# Patient Record
Sex: FEMALE | Race: WHITE | NOT HISPANIC OR LATINO | Employment: OTHER | URBAN - METROPOLITAN AREA
[De-identification: names, ages, dates, MRNs, and addresses within clinical notes are randomized per-mention and may not be internally consistent; named-entity substitution may affect disease eponyms.]

---

## 2019-11-01 ENCOUNTER — HOSPITAL ENCOUNTER (INPATIENT)
Facility: MEDICAL CENTER | Age: 69
LOS: 1 days | DRG: 644 | End: 2019-11-03
Attending: EMERGENCY MEDICINE | Admitting: INTERNAL MEDICINE
Payer: MEDICARE

## 2019-11-01 ENCOUNTER — APPOINTMENT (OUTPATIENT)
Dept: RADIOLOGY | Facility: MEDICAL CENTER | Age: 69
DRG: 644 | End: 2019-11-01
Attending: EMERGENCY MEDICINE
Payer: MEDICARE

## 2019-11-01 PROBLEM — E27.1 ADRENAL INSUFFICIENCY (ADDISON'S DISEASE) (HCC): Status: ACTIVE | Noted: 2019-11-01

## 2019-11-01 PROBLEM — R55 SYNCOPE: Status: ACTIVE | Noted: 2019-11-01

## 2019-11-01 PROBLEM — E16.2 HYPOGLYCEMIA: Status: ACTIVE | Noted: 2019-11-01

## 2019-11-01 LAB
ALBUMIN SERPL BCP-MCNC: 4.5 G/DL (ref 3.2–4.9)
ALBUMIN/GLOB SERPL: 1.4 G/DL
ALP SERPL-CCNC: 99 U/L (ref 30–99)
ALT SERPL-CCNC: 37 U/L (ref 2–50)
ANION GAP SERPL CALC-SCNC: 13 MMOL/L (ref 0–11.9)
AST SERPL-CCNC: 55 U/L (ref 12–45)
BASOPHILS # BLD AUTO: 0.5 % (ref 0–1.8)
BASOPHILS # BLD: 0.03 K/UL (ref 0–0.12)
BILIRUB SERPL-MCNC: 0.4 MG/DL (ref 0.1–1.5)
BUN SERPL-MCNC: 25 MG/DL (ref 8–22)
CALCIUM SERPL-MCNC: 9.2 MG/DL (ref 8.5–10.5)
CHLORIDE SERPL-SCNC: 102 MMOL/L (ref 96–112)
CO2 SERPL-SCNC: 18 MMOL/L (ref 20–33)
CORTIS SERPL-MCNC: 6.5 UG/DL (ref 0–23)
CREAT SERPL-MCNC: 1.24 MG/DL (ref 0.5–1.4)
EKG IMPRESSION: NORMAL
EOSINOPHIL # BLD AUTO: 0.04 K/UL (ref 0–0.51)
EOSINOPHIL NFR BLD: 0.6 % (ref 0–6.9)
ERYTHROCYTE [DISTWIDTH] IN BLOOD BY AUTOMATED COUNT: 44.6 FL (ref 35.9–50)
GLOBULIN SER CALC-MCNC: 3.2 G/DL (ref 1.9–3.5)
GLUCOSE BLD-MCNC: 54 MG/DL (ref 65–99)
GLUCOSE BLD-MCNC: 65 MG/DL (ref 65–99)
GLUCOSE SERPL-MCNC: 321 MG/DL (ref 65–99)
HCT VFR BLD AUTO: 43 % (ref 37–47)
HGB BLD-MCNC: 14.1 G/DL (ref 12–16)
IMM GRANULOCYTES # BLD AUTO: 0.01 K/UL (ref 0–0.11)
IMM GRANULOCYTES NFR BLD AUTO: 0.2 % (ref 0–0.9)
LYMPHOCYTES # BLD AUTO: 0.88 K/UL (ref 1–4.8)
LYMPHOCYTES NFR BLD: 13.7 % (ref 22–41)
MAGNESIUM SERPL-MCNC: 1.6 MG/DL (ref 1.5–2.5)
MCH RBC QN AUTO: 30.8 PG (ref 27–33)
MCHC RBC AUTO-ENTMCNC: 32.8 G/DL (ref 33.6–35)
MCV RBC AUTO: 93.9 FL (ref 81.4–97.8)
MONOCYTES # BLD AUTO: 0.36 K/UL (ref 0–0.85)
MONOCYTES NFR BLD AUTO: 5.6 % (ref 0–13.4)
NEUTROPHILS # BLD AUTO: 5.09 K/UL (ref 2–7.15)
NEUTROPHILS NFR BLD: 79.4 % (ref 44–72)
NRBC # BLD AUTO: 0 K/UL
NRBC BLD-RTO: 0 /100 WBC
PHOSPHATE SERPL-MCNC: 3.9 MG/DL (ref 2.5–4.5)
PLATELET # BLD AUTO: 149 K/UL (ref 164–446)
PMV BLD AUTO: 10.5 FL (ref 9–12.9)
POTASSIUM SERPL-SCNC: 3.9 MMOL/L (ref 3.6–5.5)
PROT SERPL-MCNC: 7.7 G/DL (ref 6–8.2)
RBC # BLD AUTO: 4.58 M/UL (ref 4.2–5.4)
SODIUM SERPL-SCNC: 133 MMOL/L (ref 135–145)
TROPONIN T SERPL-MCNC: 24 NG/L (ref 6–19)
WBC # BLD AUTO: 6.4 K/UL (ref 4.8–10.8)

## 2019-11-01 PROCEDURE — 83735 ASSAY OF MAGNESIUM: CPT

## 2019-11-01 PROCEDURE — 96374 THER/PROPH/DIAG INJ IV PUSH: CPT

## 2019-11-01 PROCEDURE — 80053 COMPREHEN METABOLIC PANEL: CPT

## 2019-11-01 PROCEDURE — 96376 TX/PRO/DX INJ SAME DRUG ADON: CPT

## 2019-11-01 PROCEDURE — 82533 TOTAL CORTISOL: CPT

## 2019-11-01 PROCEDURE — 71045 X-RAY EXAM CHEST 1 VIEW: CPT

## 2019-11-01 PROCEDURE — 87040 BLOOD CULTURE FOR BACTERIA: CPT

## 2019-11-01 PROCEDURE — 02HV33Z INSERTION OF INFUSION DEVICE INTO SUPERIOR VENA CAVA, PERCUTANEOUS APPROACH: ICD-10-PCS | Performed by: EMERGENCY MEDICINE

## 2019-11-01 PROCEDURE — 700111 HCHG RX REV CODE 636 W/ 250 OVERRIDE (IP): Performed by: EMERGENCY MEDICINE

## 2019-11-01 PROCEDURE — 94760 N-INVAS EAR/PLS OXIMETRY 1: CPT

## 2019-11-01 PROCEDURE — 700105 HCHG RX REV CODE 258

## 2019-11-01 PROCEDURE — 84484 ASSAY OF TROPONIN QUANT: CPT

## 2019-11-01 PROCEDURE — 93005 ELECTROCARDIOGRAM TRACING: CPT

## 2019-11-01 PROCEDURE — 36415 COLL VENOUS BLD VENIPUNCTURE: CPT

## 2019-11-01 PROCEDURE — 85025 COMPLETE CBC W/AUTO DIFF WBC: CPT

## 2019-11-01 PROCEDURE — 93005 ELECTROCARDIOGRAM TRACING: CPT | Performed by: EMERGENCY MEDICINE

## 2019-11-01 PROCEDURE — 36556 INSERT NON-TUNNEL CV CATH: CPT

## 2019-11-01 PROCEDURE — C1751 CATH, INF, PER/CENT/MIDLINE: HCPCS

## 2019-11-01 PROCEDURE — 82962 GLUCOSE BLOOD TEST: CPT

## 2019-11-01 PROCEDURE — 84100 ASSAY OF PHOSPHORUS: CPT

## 2019-11-01 PROCEDURE — 99291 CRITICAL CARE FIRST HOUR: CPT

## 2019-11-01 PROCEDURE — 96375 TX/PRO/DX INJ NEW DRUG ADDON: CPT

## 2019-11-01 PROCEDURE — 99291 CRITICAL CARE FIRST HOUR: CPT | Performed by: INTERNAL MEDICINE

## 2019-11-01 RX ORDER — OMEPRAZOLE 20 MG/1
20 CAPSULE, DELAYED RELEASE ORAL DAILY
COMMUNITY

## 2019-11-01 RX ORDER — ZOLPIDEM TARTRATE 10 MG/1
10 TABLET ORAL
COMMUNITY

## 2019-11-01 RX ORDER — M-VIT,TX,IRON,MINS/CALC/FOLIC 27MG-0.4MG
1 TABLET ORAL DAILY
COMMUNITY

## 2019-11-01 RX ORDER — GABAPENTIN 600 MG/1
600 TABLET ORAL 3 TIMES DAILY
COMMUNITY

## 2019-11-01 RX ORDER — MAGNESIUM SULFATE HEPTAHYDRATE 40 MG/ML
2 INJECTION, SOLUTION INTRAVENOUS ONCE
Status: COMPLETED | OUTPATIENT
Start: 2019-11-02 | End: 2019-11-02

## 2019-11-01 RX ORDER — DEXTROSE MONOHYDRATE 100 MG/ML
INJECTION, SOLUTION INTRAVENOUS
Status: COMPLETED
Start: 2019-11-01 | End: 2019-11-01

## 2019-11-01 RX ORDER — HYDROCORTISONE 10 MG/1
10 TABLET ORAL 3 TIMES DAILY
Status: ON HOLD | COMMUNITY
End: 2019-11-03 | Stop reason: SDUPTHER

## 2019-11-01 RX ORDER — LISINOPRIL 40 MG/1
40 TABLET ORAL DAILY
Status: ON HOLD | COMMUNITY
End: 2019-11-03

## 2019-11-01 RX ORDER — ONDANSETRON 4 MG/1
4 TABLET, ORALLY DISINTEGRATING ORAL ONCE
Status: COMPLETED | OUTPATIENT
Start: 2019-11-01 | End: 2019-11-01

## 2019-11-01 RX ORDER — PHENYLEPHRINE HCL IN 0.9% NACL 0.5 MG/5ML
SYRINGE (ML) INTRAVENOUS
Status: DISPENSED
Start: 2019-11-01 | End: 2019-11-02

## 2019-11-01 RX ORDER — DULOXETIN HYDROCHLORIDE 60 MG/1
60 CAPSULE, DELAYED RELEASE ORAL DAILY
COMMUNITY

## 2019-11-01 RX ADMIN — HYDROCORTISONE SODIUM SUCCINATE 100 MG: 100 INJECTION, POWDER, FOR SOLUTION INTRAMUSCULAR; INTRAVENOUS at 21:11

## 2019-11-01 RX ADMIN — DEXTROSE MONOHYDRATE 250 ML: 100 INJECTION, SOLUTION INTRAVENOUS at 22:00

## 2019-11-01 RX ADMIN — DEXTROSE MONOHYDRATE 250 ML: 100 INJECTION, SOLUTION INTRAVENOUS at 21:12

## 2019-11-01 RX ADMIN — ONDANSETRON 4 MG: 4 TABLET, ORALLY DISINTEGRATING ORAL at 22:20

## 2019-11-01 SDOH — HEALTH STABILITY: MENTAL HEALTH: HOW OFTEN DO YOU HAVE A DRINK CONTAINING ALCOHOL?: 2-4 TIMES A MONTH

## 2019-11-01 SDOH — HEALTH STABILITY: MENTAL HEALTH: HOW MANY STANDARD DRINKS CONTAINING ALCOHOL DO YOU HAVE ON A TYPICAL DAY?: 1 OR 2

## 2019-11-01 SDOH — HEALTH STABILITY: MENTAL HEALTH: HOW OFTEN DO YOU HAVE 6 OR MORE DRINKS ON ONE OCCASION?: NEVER

## 2019-11-01 ASSESSMENT — ENCOUNTER SYMPTOMS
VOMITING: 1
DEPRESSION: 0
BLURRED VISION: 0
FEVER: 0
CHILLS: 1
SPUTUM PRODUCTION: 0
HEADACHES: 1
SHORTNESS OF BREATH: 0
DOUBLE VISION: 0
SPEECH CHANGE: 0
NAUSEA: 1
WEAKNESS: 1
FOCAL WEAKNESS: 0
PALPITATIONS: 0

## 2019-11-02 ENCOUNTER — APPOINTMENT (OUTPATIENT)
Dept: CARDIOLOGY | Facility: MEDICAL CENTER | Age: 69
DRG: 644 | End: 2019-11-02
Attending: INTERNAL MEDICINE
Payer: MEDICARE

## 2019-11-02 ENCOUNTER — APPOINTMENT (OUTPATIENT)
Dept: RADIOLOGY | Facility: MEDICAL CENTER | Age: 69
DRG: 644 | End: 2019-11-02
Attending: INTERNAL MEDICINE
Payer: MEDICARE

## 2019-11-02 PROBLEM — E87.1 HYPONATREMIA: Status: ACTIVE | Noted: 2019-11-02

## 2019-11-02 PROBLEM — K21.9 GERD (GASTROESOPHAGEAL REFLUX DISEASE): Status: ACTIVE | Noted: 2019-11-02

## 2019-11-02 PROBLEM — E27.2 ADDISONIAN CRISIS (HCC): Status: ACTIVE | Noted: 2019-11-02

## 2019-11-02 LAB
ALBUMIN SERPL BCP-MCNC: 4 G/DL (ref 3.2–4.9)
ALBUMIN/GLOB SERPL: 1.3 G/DL
ALP SERPL-CCNC: 90 U/L (ref 30–99)
ALT SERPL-CCNC: 36 U/L (ref 2–50)
ANION GAP SERPL CALC-SCNC: 8 MMOL/L (ref 0–11.9)
ANION GAP SERPL CALC-SCNC: 9 MMOL/L (ref 0–11.9)
AST SERPL-CCNC: 46 U/L (ref 12–45)
BASOPHILS # BLD AUTO: 0.5 % (ref 0–1.8)
BASOPHILS # BLD AUTO: 0.6 % (ref 0–1.8)
BASOPHILS # BLD: 0.03 K/UL (ref 0–0.12)
BASOPHILS # BLD: 0.04 K/UL (ref 0–0.12)
BILIRUB SERPL-MCNC: 0.4 MG/DL (ref 0.1–1.5)
BUN SERPL-MCNC: 26 MG/DL (ref 8–22)
BUN SERPL-MCNC: 27 MG/DL (ref 8–22)
CALCIUM SERPL-MCNC: 7.9 MG/DL (ref 8.5–10.5)
CALCIUM SERPL-MCNC: 8.7 MG/DL (ref 8.5–10.5)
CHLORIDE SERPL-SCNC: 103 MMOL/L (ref 96–112)
CHLORIDE SERPL-SCNC: 110 MMOL/L (ref 96–112)
CO2 SERPL-SCNC: 19 MMOL/L (ref 20–33)
CO2 SERPL-SCNC: 21 MMOL/L (ref 20–33)
CREAT SERPL-MCNC: 1.31 MG/DL (ref 0.5–1.4)
CREAT SERPL-MCNC: 1.32 MG/DL (ref 0.5–1.4)
EOSINOPHIL # BLD AUTO: 0.01 K/UL (ref 0–0.51)
EOSINOPHIL # BLD AUTO: 0.03 K/UL (ref 0–0.51)
EOSINOPHIL NFR BLD: 0.2 % (ref 0–6.9)
EOSINOPHIL NFR BLD: 0.4 % (ref 0–6.9)
ERYTHROCYTE [DISTWIDTH] IN BLOOD BY AUTOMATED COUNT: 43.2 FL (ref 35.9–50)
ERYTHROCYTE [DISTWIDTH] IN BLOOD BY AUTOMATED COUNT: 44.2 FL (ref 35.9–50)
GLOBULIN SER CALC-MCNC: 3 G/DL (ref 1.9–3.5)
GLUCOSE BLD-MCNC: 110 MG/DL (ref 65–99)
GLUCOSE BLD-MCNC: 112 MG/DL (ref 65–99)
GLUCOSE BLD-MCNC: 115 MG/DL (ref 65–99)
GLUCOSE BLD-MCNC: 122 MG/DL (ref 65–99)
GLUCOSE BLD-MCNC: 124 MG/DL (ref 65–99)
GLUCOSE BLD-MCNC: 127 MG/DL (ref 65–99)
GLUCOSE BLD-MCNC: 128 MG/DL (ref 65–99)
GLUCOSE BLD-MCNC: 138 MG/DL (ref 65–99)
GLUCOSE SERPL-MCNC: 138 MG/DL (ref 65–99)
GLUCOSE SERPL-MCNC: 195 MG/DL (ref 65–99)
HCT VFR BLD AUTO: 37.9 % (ref 37–47)
HCT VFR BLD AUTO: 40.8 % (ref 37–47)
HGB BLD-MCNC: 12.5 G/DL (ref 12–16)
HGB BLD-MCNC: 13.3 G/DL (ref 12–16)
IMM GRANULOCYTES # BLD AUTO: 0.02 K/UL (ref 0–0.11)
IMM GRANULOCYTES # BLD AUTO: 0.02 K/UL (ref 0–0.11)
IMM GRANULOCYTES NFR BLD AUTO: 0.3 % (ref 0–0.9)
IMM GRANULOCYTES NFR BLD AUTO: 0.3 % (ref 0–0.9)
LACTATE BLD-SCNC: 1.8 MMOL/L (ref 0.5–2)
LACTATE BLD-SCNC: 2.1 MMOL/L (ref 0.5–2)
LV EJECT FRACT  99904: 60
LYMPHOCYTES # BLD AUTO: 0.6 K/UL (ref 1–4.8)
LYMPHOCYTES # BLD AUTO: 0.61 K/UL (ref 1–4.8)
LYMPHOCYTES NFR BLD: 8.9 % (ref 22–41)
LYMPHOCYTES NFR BLD: 9.7 % (ref 22–41)
MAGNESIUM SERPL-MCNC: 1.5 MG/DL (ref 1.5–2.5)
MCH RBC QN AUTO: 30.4 PG (ref 27–33)
MCH RBC QN AUTO: 30.4 PG (ref 27–33)
MCHC RBC AUTO-ENTMCNC: 32.6 G/DL (ref 33.6–35)
MCHC RBC AUTO-ENTMCNC: 33 G/DL (ref 33.6–35)
MCV RBC AUTO: 92.2 FL (ref 81.4–97.8)
MCV RBC AUTO: 93.4 FL (ref 81.4–97.8)
MONOCYTES # BLD AUTO: 0.13 K/UL (ref 0–0.85)
MONOCYTES # BLD AUTO: 0.36 K/UL (ref 0–0.85)
MONOCYTES NFR BLD AUTO: 2.1 % (ref 0–13.4)
MONOCYTES NFR BLD AUTO: 5.2 % (ref 0–13.4)
NEUTROPHILS # BLD AUTO: 5.39 K/UL (ref 2–7.15)
NEUTROPHILS # BLD AUTO: 5.83 K/UL (ref 2–7.15)
NEUTROPHILS NFR BLD: 84.6 % (ref 44–72)
NEUTROPHILS NFR BLD: 87.2 % (ref 44–72)
NRBC # BLD AUTO: 0 K/UL
NRBC # BLD AUTO: 0 K/UL
NRBC BLD-RTO: 0 /100 WBC
NRBC BLD-RTO: 0 /100 WBC
PLATELET # BLD AUTO: 154 K/UL (ref 164–446)
PLATELET # BLD AUTO: 157 K/UL (ref 164–446)
PMV BLD AUTO: 10.4 FL (ref 9–12.9)
PMV BLD AUTO: 10.6 FL (ref 9–12.9)
POTASSIUM SERPL-SCNC: 3.6 MMOL/L (ref 3.6–5.5)
POTASSIUM SERPL-SCNC: 4 MMOL/L (ref 3.6–5.5)
PROT SERPL-MCNC: 7 G/DL (ref 6–8.2)
RBC # BLD AUTO: 4.11 M/UL (ref 4.2–5.4)
RBC # BLD AUTO: 4.37 M/UL (ref 4.2–5.4)
SODIUM SERPL-SCNC: 133 MMOL/L (ref 135–145)
SODIUM SERPL-SCNC: 137 MMOL/L (ref 135–145)
WBC # BLD AUTO: 6.2 K/UL (ref 4.8–10.8)
WBC # BLD AUTO: 6.9 K/UL (ref 4.8–10.8)

## 2019-11-02 PROCEDURE — 700102 HCHG RX REV CODE 250 W/ 637 OVERRIDE(OP): Performed by: INTERNAL MEDICINE

## 2019-11-02 PROCEDURE — 80053 COMPREHEN METABOLIC PANEL: CPT

## 2019-11-02 PROCEDURE — 83735 ASSAY OF MAGNESIUM: CPT

## 2019-11-02 PROCEDURE — 700111 HCHG RX REV CODE 636 W/ 250 OVERRIDE (IP): Performed by: INTERNAL MEDICINE

## 2019-11-02 PROCEDURE — 71045 X-RAY EXAM CHEST 1 VIEW: CPT

## 2019-11-02 PROCEDURE — 80048 BASIC METABOLIC PNL TOTAL CA: CPT

## 2019-11-02 PROCEDURE — 85025 COMPLETE CBC W/AUTO DIFF WBC: CPT

## 2019-11-02 PROCEDURE — 770020 HCHG ROOM/CARE - TELE (206)

## 2019-11-02 PROCEDURE — 93306 TTE W/DOPPLER COMPLETE: CPT | Mod: 26 | Performed by: INTERNAL MEDICINE

## 2019-11-02 PROCEDURE — 83605 ASSAY OF LACTIC ACID: CPT

## 2019-11-02 PROCEDURE — 99223 1ST HOSP IP/OBS HIGH 75: CPT | Mod: AI | Performed by: INTERNAL MEDICINE

## 2019-11-02 PROCEDURE — 700105 HCHG RX REV CODE 258: Performed by: INTERNAL MEDICINE

## 2019-11-02 PROCEDURE — 700111 HCHG RX REV CODE 636 W/ 250 OVERRIDE (IP): Performed by: HOSPITALIST

## 2019-11-02 PROCEDURE — 93306 TTE W/DOPPLER COMPLETE: CPT

## 2019-11-02 PROCEDURE — A9270 NON-COVERED ITEM OR SERVICE: HCPCS | Performed by: INTERNAL MEDICINE

## 2019-11-02 PROCEDURE — 82962 GLUCOSE BLOOD TEST: CPT | Mod: 91

## 2019-11-02 RX ORDER — OMEPRAZOLE 20 MG/1
20 CAPSULE, DELAYED RELEASE ORAL DAILY
Status: DISCONTINUED | OUTPATIENT
Start: 2019-11-02 | End: 2019-11-03 | Stop reason: HOSPADM

## 2019-11-02 RX ORDER — ZOLPIDEM TARTRATE 5 MG/1
10 TABLET ORAL
Status: DISCONTINUED | OUTPATIENT
Start: 2019-11-02 | End: 2019-11-03 | Stop reason: HOSPADM

## 2019-11-02 RX ORDER — GABAPENTIN 300 MG/1
600 CAPSULE ORAL 3 TIMES DAILY
Status: DISCONTINUED | OUTPATIENT
Start: 2019-11-02 | End: 2019-11-02

## 2019-11-02 RX ORDER — AMOXICILLIN 250 MG
2 CAPSULE ORAL 2 TIMES DAILY
Status: DISCONTINUED | OUTPATIENT
Start: 2019-11-02 | End: 2019-11-03 | Stop reason: HOSPADM

## 2019-11-02 RX ORDER — BISACODYL 10 MG
10 SUPPOSITORY, RECTAL RECTAL
Status: DISCONTINUED | OUTPATIENT
Start: 2019-11-02 | End: 2019-11-03 | Stop reason: HOSPADM

## 2019-11-02 RX ORDER — SODIUM CHLORIDE 9 MG/ML
1000 INJECTION, SOLUTION INTRAVENOUS ONCE
Status: COMPLETED | OUTPATIENT
Start: 2019-11-02 | End: 2019-11-02

## 2019-11-02 RX ORDER — SODIUM CHLORIDE, SODIUM LACTATE, POTASSIUM CHLORIDE, AND CALCIUM CHLORIDE .6; .31; .03; .02 G/100ML; G/100ML; G/100ML; G/100ML
1000 INJECTION, SOLUTION INTRAVENOUS
Status: DISCONTINUED | OUTPATIENT
Start: 2019-11-02 | End: 2019-11-03 | Stop reason: HOSPADM

## 2019-11-02 RX ORDER — SODIUM CHLORIDE 9 MG/ML
INJECTION, SOLUTION INTRAVENOUS CONTINUOUS
Status: DISCONTINUED | OUTPATIENT
Start: 2019-11-02 | End: 2019-11-02

## 2019-11-02 RX ORDER — DULOXETIN HYDROCHLORIDE 60 MG/1
60 CAPSULE, DELAYED RELEASE ORAL DAILY
Status: DISCONTINUED | OUTPATIENT
Start: 2019-11-02 | End: 2019-11-03 | Stop reason: HOSPADM

## 2019-11-02 RX ORDER — POLYETHYLENE GLYCOL 3350 17 G/17G
1 POWDER, FOR SOLUTION ORAL
Status: DISCONTINUED | OUTPATIENT
Start: 2019-11-02 | End: 2019-11-03 | Stop reason: HOSPADM

## 2019-11-02 RX ORDER — ACETAMINOPHEN 325 MG/1
650 TABLET ORAL EVERY 6 HOURS PRN
Status: DISCONTINUED | OUTPATIENT
Start: 2019-11-02 | End: 2019-11-03 | Stop reason: HOSPADM

## 2019-11-02 RX ORDER — MAGNESIUM SULFATE HEPTAHYDRATE 40 MG/ML
2 INJECTION, SOLUTION INTRAVENOUS ONCE
Status: COMPLETED | OUTPATIENT
Start: 2019-11-02 | End: 2019-11-02

## 2019-11-02 RX ORDER — GABAPENTIN 300 MG/1
600 CAPSULE ORAL 2 TIMES DAILY
Status: DISCONTINUED | OUTPATIENT
Start: 2019-11-02 | End: 2019-11-03 | Stop reason: HOSPADM

## 2019-11-02 RX ADMIN — GABAPENTIN 600 MG: 300 CAPSULE ORAL at 06:25

## 2019-11-02 RX ADMIN — OMEPRAZOLE 20 MG: 20 CAPSULE, DELAYED RELEASE ORAL at 06:25

## 2019-11-02 RX ADMIN — SODIUM CHLORIDE 1000 ML: 9 INJECTION, SOLUTION INTRAVENOUS at 02:43

## 2019-11-02 RX ADMIN — HYDROCORTISONE SODIUM SUCCINATE 50 MG: 100 INJECTION, POWDER, FOR SOLUTION INTRAMUSCULAR; INTRAVENOUS at 11:59

## 2019-11-02 RX ADMIN — SODIUM CHLORIDE: 9 INJECTION, SOLUTION INTRAVENOUS at 02:30

## 2019-11-02 RX ADMIN — GABAPENTIN 600 MG: 300 CAPSULE ORAL at 17:53

## 2019-11-02 RX ADMIN — MAGNESIUM SULFATE IN WATER 2 G: 40 INJECTION, SOLUTION INTRAVENOUS at 08:28

## 2019-11-02 RX ADMIN — HYDROCORTISONE SODIUM SUCCINATE 25 MG: 100 INJECTION, POWDER, FOR SOLUTION INTRAMUSCULAR; INTRAVENOUS at 17:54

## 2019-11-02 RX ADMIN — HYDROCORTISONE SODIUM SUCCINATE 50 MG: 100 INJECTION, POWDER, FOR SOLUTION INTRAMUSCULAR; INTRAVENOUS at 06:25

## 2019-11-02 RX ADMIN — ZOLPIDEM TARTRATE 10 MG: 5 TABLET ORAL at 21:46

## 2019-11-02 RX ADMIN — DULOXETINE HYDROCHLORIDE 60 MG: 60 CAPSULE, DELAYED RELEASE ORAL at 06:25

## 2019-11-02 RX ADMIN — HYDROCORTISONE SODIUM SUCCINATE 50 MG: 100 INJECTION, POWDER, FOR SOLUTION INTRAMUSCULAR; INTRAVENOUS at 02:29

## 2019-11-02 RX ADMIN — MAGNESIUM SULFATE 2 G: 2 INJECTION INTRAVENOUS at 02:28

## 2019-11-02 ASSESSMENT — COPD QUESTIONNAIRES
DO YOU EVER COUGH UP ANY MUCUS OR PHLEGM?: NO/ONLY WITH OCCASIONAL COLDS OR INFECTIONS
COPD SCREENING SCORE: 2
HAVE YOU SMOKED AT LEAST 100 CIGARETTES IN YOUR ENTIRE LIFE: NO/DON'T KNOW
DURING THE PAST 4 WEEKS HOW MUCH DID YOU FEEL SHORT OF BREATH: NONE/LITTLE OF THE TIME

## 2019-11-02 ASSESSMENT — LIFESTYLE VARIABLES
HAVE YOU EVER FELT YOU SHOULD CUT DOWN ON YOUR DRINKING: NO
TOTAL SCORE: 0
EVER FELT BAD OR GUILTY ABOUT YOUR DRINKING: NO
EVER_SMOKED: NEVER
TOTAL SCORE: 0
ON A TYPICAL DAY WHEN YOU DRINK ALCOHOL HOW MANY DRINKS DO YOU HAVE: 1
DOES PATIENT WANT TO STOP DRINKING: NO
HAVE PEOPLE ANNOYED YOU BY CRITICIZING YOUR DRINKING: NO
EVER HAD A DRINK FIRST THING IN THE MORNING TO STEADY YOUR NERVES TO GET RID OF A HANGOVER: NO
TOTAL SCORE: 0
HAVE PEOPLE ANNOYED YOU BY CRITICIZING YOUR DRINKING: NO
AVERAGE NUMBER OF DAYS PER WEEK YOU HAVE A DRINK CONTAINING ALCOHOL: 1
TOTAL SCORE: 0
ALCOHOL_USE: YES
CONSUMPTION TOTAL: NEGATIVE
ALCOHOL_USE: YES
HAVE YOU EVER FELT YOU SHOULD CUT DOWN ON YOUR DRINKING: NO
CONSUMPTION TOTAL: INCOMPLETE
EVER HAD A DRINK FIRST THING IN THE MORNING TO STEADY YOUR NERVES TO GET RID OF A HANGOVER: NO
EVER_SMOKED: NEVER
TOTAL SCORE: 0
EVER FELT BAD OR GUILTY ABOUT YOUR DRINKING: NO
TOTAL SCORE: 0
HOW MANY TIMES IN THE PAST YEAR HAVE YOU HAD 5 OR MORE DRINKS IN A DAY: 0

## 2019-11-02 ASSESSMENT — PATIENT HEALTH QUESTIONNAIRE - PHQ9
1. LITTLE INTEREST OR PLEASURE IN DOING THINGS: NOT AT ALL
2. FEELING DOWN, DEPRESSED, IRRITABLE, OR HOPELESS: NOT AT ALL
SUM OF ALL RESPONSES TO PHQ9 QUESTIONS 1 AND 2: 0

## 2019-11-02 ASSESSMENT — ENCOUNTER SYMPTOMS
NAUSEA: 0
FOCAL WEAKNESS: 0
SEIZURES: 0
PALPITATIONS: 0
SPUTUM PRODUCTION: 0
COUGH: 0
BACK PAIN: 0
WEAKNESS: 1
BLOOD IN STOOL: 0
CHILLS: 0
SORE THROAT: 0
BRUISES/BLEEDS EASILY: 0
ABDOMINAL PAIN: 0
LOSS OF CONSCIOUSNESS: 1
DIAPHORESIS: 0
NECK PAIN: 0
DIZZINESS: 1
SHORTNESS OF BREATH: 0
WHEEZING: 0
VOMITING: 0
HEADACHES: 0
DIARRHEA: 0
MYALGIAS: 0
FEVER: 0
BLURRED VISION: 0
FLANK PAIN: 0

## 2019-11-02 ASSESSMENT — COGNITIVE AND FUNCTIONAL STATUS - GENERAL
DRESSING REGULAR LOWER BODY CLOTHING: A LITTLE
SUGGESTED CMS G CODE MODIFIER DAILY ACTIVITY: CI
TOILETING: A LITTLE
TOILETING: A LITTLE
DAILY ACTIVITIY SCORE: 23
SUGGESTED CMS G CODE MODIFIER DAILY ACTIVITY: CJ
MOVING FROM LYING ON BACK TO SITTING ON SIDE OF FLAT BED: A LITTLE
WALKING IN HOSPITAL ROOM: A LITTLE
DAILY ACTIVITIY SCORE: 22
CLIMB 3 TO 5 STEPS WITH RAILING: A LITTLE
SUGGESTED CMS G CODE MODIFIER MOBILITY: CJ
MOBILITY SCORE: 22
STANDING UP FROM CHAIR USING ARMS: A LITTLE
MOBILITY SCORE: 20
SUGGESTED CMS G CODE MODIFIER MOBILITY: CJ
WALKING IN HOSPITAL ROOM: A LITTLE
CLIMB 3 TO 5 STEPS WITH RAILING: A LITTLE

## 2019-11-02 NOTE — PROGRESS NOTES
2 RN skin check complete.   Devices in place O2, RIJ     Skin assessed under devices yes.  Confirmed pressure ulcers found on none.  New potential pressure ulcers noted on none. Wound consult placed no.  The following interventions in place none  Pt is alert and oriented x4.  SBA for ambulation. Turns self in bed.  Skin intact

## 2019-11-02 NOTE — ASSESSMENT & PLAN NOTE
Patient has been started on IV hydrocortisone   Aggressive IV fluid hydration with NS  IV Levophed titrate to map greater than 65  Rule out infection.  Follow blood cultures and UA  Rule out cardiogenic causes of hypotension. Continuous cardiac monitoring.  Check 2D echo

## 2019-11-02 NOTE — ED PROVIDER NOTES
ED Provider Note    Scribed for Jimi Saba D.O. by Kym Madison. 11/1/2019  8:41 PM    Primary care provider: None noted  Means of arrival: EMS  History obtained from: Patient and   History limited by: None    CHIEF COMPLAINT  Chief Complaint   Patient presents with   • Syncope     Witnessed syncope by  while sitting. Denies fall, no head trauma. Takes daily aspirin. BG = 68 PTA by EMS     HPI  Anastasia Land is a 69 y.o. female who presents to the Emergency Department for evaluation of a witnessed syncopal event at 6:00 PM tonight. She was attending a concert with her  and was sitting down. She got up to use the bathroom. She was unsteady on her feet and then loss consciousness. Per , she was unresponsive for 10 seconds. When she regained consciousness, she vomited. The patient reports drinking 1 martini before 6:00 PM and last ate at 6:00 PM. The patient endorses associated copper taste in mouth but denies any dysuria, chest pain, infections, or UTI.     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 She denies a history of cancer or talking insulin. She reports that she takes hydrocortisone and prednisone, which she last took at 5:00 PM tonight.     REVIEW OF SYSTEMS  Pertinent positives include syncope, loss of consciousness, vomiting, and copper taste in mouth.   Pertinent negatives include no head trauma, dysuria, chest pain, infections, or UTI.    All other systems reviewed and negative.    PAST MEDICAL HISTORY  Past Medical History:   Diagnosis Date   • Back pain    • Hypertension    • Psychiatric disorder     depression     SURGICAL HISTORY  Past Surgical History:   Procedure Laterality Date   • FINGER OR HAND INCISION AND DRAINAGE  8/3/08    Performed by VIMAL CANO at SURGERY [facility]      SOCIAL HISTORY  Social History     Tobacco Use   • Smoking status:  "Never Smoker   Substance Use Topics   • Alcohol use: Yes     Frequency: 2-4 times a month     Drinks per session: 1 or 2     Binge frequency: Never   • Drug use: No      Social History     Substance and Sexual Activity   Drug Use No     FAMILY HISTORY  History reviewed. No pertinent family history.    CURRENT MEDICATIONS  Current Outpatient Medications:   •  AMBIEN PO, , Disp: , Rfl:   •  PAXIL PO, , Disp: , Rfl:   •  DIOVAN PO, , Disp: , Rfl:   •  OXYCODONE HCL PO, , Disp: , Rfl:   •  METAMUCIL PO, , Disp: , Rfl:     ALLERGIES  Allergies   Allergen Reactions   • Heparin      PHYSICAL EXAM  VITAL SIGNS: /67   Pulse 98   Temp 37.3 °C (99.2 °F) (Temporal)   Resp 16   Ht 1.575 m (5' 2\")   Wt 79.4 kg (175 lb)   SpO2 95%   BMI 32.01 kg/m²     Nursing notes and vitals reviewed.  Constitutional: Well developed, Well nourished, No acute distress, Non-toxic appearance.   Eyes: PERRLA, EOMI, Conjunctiva normal, No discharge.   Cardiovascular: Normal heart rate, Normal rhythm, No murmurs, No rubs, No gallops.   Thorax & Lungs: No respiratory distress, No rales, No rhonchi, No wheezing, No chest tenderness.   Abdomen: Bowel sounds normal, Soft, No tenderness, No guarding, No rebound, No masses, No pulsatile masses.   Skin: Warm, Dry, No erythema, No rash.   Musculoskeletal: Intact distal pulses, No edema, No cyanosis, No clubbing. Good range of motion in all major joints. No tenderness to palpation or major deformities noted, no CVA tenderness, no midline back tenderness.   Neurologic: Alert & oriented x 3, Normal motor function, Normal sensory function, No focal deficits noted.  Psychiatric: Affect normal for clinical presentation.      DIAGNOSTIC STUDIES/PROCEDURES    LABS  Results for orders placed or performed during the hospital encounter of 11/01/19   CBC WITH DIFFERENTIAL   Result Value Ref Range    WBC 6.4 4.8 - 10.8 K/uL    RBC 4.58 4.20 - 5.40 M/uL    Hemoglobin 14.1 12.0 - 16.0 g/dL    Hematocrit 43.0 " 37.0 - 47.0 %    MCV 93.9 81.4 - 97.8 fL    MCH 30.8 27.0 - 33.0 pg    MCHC 32.8 (L) 33.6 - 35.0 g/dL    RDW 44.6 35.9 - 50.0 fL    Platelet Count 149 (L) 164 - 446 K/uL    MPV 10.5 9.0 - 12.9 fL    Neutrophils-Polys 79.40 (H) 44.00 - 72.00 %    Lymphocytes 13.70 (L) 22.00 - 41.00 %    Monocytes 5.60 0.00 - 13.40 %    Eosinophils 0.60 0.00 - 6.90 %    Basophils 0.50 0.00 - 1.80 %    Immature Granulocytes 0.20 0.00 - 0.90 %    Nucleated RBC 0.00 /100 WBC    Neutrophils (Absolute) 5.09 2.00 - 7.15 K/uL    Lymphs (Absolute) 0.88 (L) 1.00 - 4.80 K/uL    Monos (Absolute) 0.36 0.00 - 0.85 K/uL    Eos (Absolute) 0.04 0.00 - 0.51 K/uL    Baso (Absolute) 0.03 0.00 - 0.12 K/uL    Immature Granulocytes (abs) 0.01 0.00 - 0.11 K/uL    NRBC (Absolute) 0.00 K/uL   COMP METABOLIC PANEL   Result Value Ref Range    Sodium 133 (L) 135 - 145 mmol/L    Potassium 3.9 3.6 - 5.5 mmol/L    Chloride 102 96 - 112 mmol/L    Co2 18 (L) 20 - 33 mmol/L    Anion Gap 13.0 (H) 0.0 - 11.9    Glucose 321 (H) 65 - 99 mg/dL    Bun 25 (H) 8 - 22 mg/dL    Creatinine 1.24 0.50 - 1.40 mg/dL    Calcium 9.2 8.5 - 10.5 mg/dL    AST(SGOT) 55 (H) 12 - 45 U/L    ALT(SGPT) 37 2 - 50 U/L    Alkaline Phosphatase 99 30 - 99 U/L    Total Bilirubin 0.4 0.1 - 1.5 mg/dL    Albumin 4.5 3.2 - 4.9 g/dL    Total Protein 7.7 6.0 - 8.2 g/dL    Globulin 3.2 1.9 - 3.5 g/dL    A-G Ratio 1.4 g/dL   TROPONIN   Result Value Ref Range    Troponin T 24 (H) 6 - 19 ng/L   MAGNESIUM   Result Value Ref Range    Magnesium 1.6 1.5 - 2.5 mg/dL   PHOSPHORUS   Result Value Ref Range    Phosphorus 3.9 2.5 - 4.5 mg/dL   ESTIMATED GFR   Result Value Ref Range    GFR If  52 (A) >60 mL/min/1.73 m 2    GFR If Non  43 (A) >60 mL/min/1.73 m 2   ACCU-CHEK GLUCOSE   Result Value Ref Range    Glucose - Accu-Ck 54 (L) 65 - 99 mg/dL   ACCU-CHEK GLUCOSE   Result Value Ref Range    Glucose - Accu-Ck 65 65 - 99 mg/dL   EKG   Result Value Ref Range    Report       [facility] Emergency Dept.    Test Date:  2019  Pt Name:    TIMOTHY HANCOCK                 Department: ER  MRN:        [id number]                      Room:       RD 05  Gender:     Female                       Technician: [id number]  :        1950                   Requested By:ER TRIAGE PROTOCOL  Order #:    [id number]                    Reading MD: JUN NOYOLA DO    Measurements  Intervals                                Axis  Rate:       99                           P:          35  NV:         160                          QRS:        -3  QRSD:       87                           T:          3  QT:         340  QTc:        437    Interpretive Statements  Sinus rhythm  Inferior infarct, old  Consider anterior infarct  Compared to ECG 2008 12:04:09  Left ventricular hypertrophy no longer present  Myocardial infarct finding still present  Electronically Signed On 2019 23:43:33 PDT by JUN NOYOLA DO     All labs reviewed by me.    RADIOLOGY  DX-CHEST-PORTABLE (1 VIEW)   Final Result      1.  Right IJ central venous catheter tip projects over the distal SVC. No pneumothorax.   2.  No acute abnormality.      EC-ECHOCARDIOGRAM COMPLETE W/O CONT    (Results Pending)     The radiologist's interpretation of all radiological studies have been reviewed by me.    COURSE & MEDICAL DECISION MAKING  Pertinent Labs & Imaging studies reviewed. (See chart for details)    8:41 PM - Patient seen and examined at bedside.    Central Line Placement Procedure Note  Indication: vascular access, poor peripheral access, long term access, central venous monitoring and centrally administered medications    Consent: The patient provided verbal consent for this procedure.    Procedure: The patient was positioned appropriately and the skin over the right internal jugular vein was prepped with Chloraprep. Local anesthesia was obtained by infiltration using 1% Lidocaine without epinephrine.  A large  bore needle was used to identify the vein.  A guide wire was then inserted into the vein through the needle. A triple lumen catheter was then inserted into the vessel over the guide wire using the Seldinger technique.  All ports showed good, free flowing blood return and were flushed with saline solution.  The catheter was then securely fastened to the skin with sutures and covered with a sterile dressing.  A post procedure X-ray was ordered and showed good line position.    The patient tolerated the procedure well.    Complications: None        This is a charming 69 y.o. female that presents with hypoglycemia, syncopal episode.  Here in the emergency department, the patient had a blood sugar initially in the 60s and went down to the 50s.  She has poor vascular access and peripheral veins.  The first IV infiltrated prior to receiving medication although she was on an infusion of more of the glucose solution as we do not have D50 here in the hospital.  I was concerned secondary to her Dickson's history and may be an adrenal crisis therefore she received hydrocortisone 125 mg IV.  Following this, the patient's blood pressure did perform it to the 70 systolic range and we did not IV access therefore central venous catheter was placed without complication.  During the procedure, the patient's blood pressure did rebound with a systolic blood pressure 110 systolic and patient is acting appropriately.  There is one bout of syncope for approximately 10 seconds in front of me yet her airway is not compromised, I did not perform a jaw thrust and the patient awoke with no evidence of postictal event and the patient was mentating fine.  This point the patient does not present with an ST elevation myocardial infarction although she does have a slightly elevated troponin of 24 EKGs inconclusive.  She will receive IV fluids, glucose, hydrocortisone and will be admitted to Dr. Louis in critical condition.  In addition I have  discussed the patient with Dr. Hill for further evaluation and management.    CRITICAL CARE  The very real possibilty of a deterioration of this patient's condition required the highest level of my preparedness for sudden, emergent intervention.  I provided critical care services, which included medication orders, frequent reevaluations of the patient's condition and response to treatment, ordering and reviewing test results, and discussing the case with various consultants.  The critical care time associated with the care of the patient was 35 minutes. Review chart for interventions. This time is exclusive of any other billable procedures.       DISPOSITION:  Patient will be admitted to Dr. Hill in critical condition.    FINAL IMPRESSION  Hypoglycemia  Adrenal crisis  Central venous access  Hypotension  Critical care time 35 minutes     IKym (Belen), am scribing for, and in the presence of, Jimi Saba D.O    Electronically signed by: Kym Madison ([place]), 11/1/2019    IJimi D.O. personally performed the services described in this documentation, as scribed by Kym Madison in my presence, and it is both accurate and complete.    C.     The note accurately reflects work and decisions made by me.  Jimi Saba  11/1/2019  11:37 PM

## 2019-11-02 NOTE — ED TRIAGE NOTES
"Chief Complaint   Patient presents with   • Syncope     Witnessed syncope by  while sitting. Denies fall, no head trauma. Takes daily aspirin. BG = 68 PTA by EMS       Pt BIB [facility] for above complaint. Pt was walking down aisle at concert to go to the bathroom and felt dizzy so sat down. Witnessed syncope while sitting. No seizure activity. + nausea prior to event, vomited x 2 upon awakening. Denies head trauma. No neuro deficits noted.       EKG complete. Labs sent.       /67   Pulse 98   Temp 37.3 °C (99.2 °F) (Temporal)   Resp 16   Ht 1.575 m (5' 2\")   Wt 79.4 kg (175 lb)   SpO2 95%   BMI 32.01 kg/m²         "

## 2019-11-02 NOTE — ASSESSMENT & PLAN NOTE
Suspected to be secondary to adrenal insufficiency  Check echocardiogram, continuous cardiac monitoring, troponin,  Check orthostatics  Hourly  FS BS with titration of D10to keep BS greater than 80

## 2019-11-02 NOTE — PROGRESS NOTES
Patient admitted this morning by Dr. Hill with syncopal episode and hypoglycemia has a history of panhypopituitarism following pituitary tumor resection.  Patient is seen in the ICU chart reviewed and discussed with nursing staff pharmacist and Dr. Elizondo.. Patient clinically improved on stress dose hydrocortisone, will transfer to telemetry floor, will taper IV hydrocortisone and continue close clinical monitoring.  Continue telemetry monitoring check echocardiogram.  Plan of care reviewed with the patient and her  and the questions answered

## 2019-11-02 NOTE — CARE PLAN
Problem: Knowledge Deficit  Goal: Knowledge of disease process/condition, treatment plan, diagnostic tests, and medications will improve  Note:   Pt educated on plan of care and all interventions. Questions answered.     Problem: Fluid Volume:  Goal: Will maintain balanced intake and output  Note:   IV fluids per MD. Monitoring I/O throughout shift.

## 2019-11-02 NOTE — ASSESSMENT & PLAN NOTE
Rule out cardiogenic causes  Continuous cardiac monitoring on telemetry  Check 2D echo  Check orthostatics

## 2019-11-02 NOTE — ED NOTES
Syncopal episode during PIV insertion attempt witnessed by ERP. Pt vomited upon wakeng. ODT zofran given per MAR. Now pt lethargic, oriented x4.

## 2019-11-02 NOTE — ASSESSMENT & PLAN NOTE
Symptomatic with reported syncope  Q. one hour FS BS  Stress dose steroids  D10 infusion  Check cortisol level

## 2019-11-02 NOTE — ASSESSMENT & PLAN NOTE
Patient was given IV dextrose with improvement of her blood sugar  Hypoglycemic protocol in place

## 2019-11-02 NOTE — CONSULTS
Critical Care Consultation    Date of consult: 11/1/2019    Referring Physician  DAKOTA Hu*    Reason for Consultation  Symptomatic hypoglycemia    History of Presenting Illness  69 y.o. female with known history of previous pituitary lesion status post resection and on daily hydrocortisone, HTN, GERD, anxiety, and neuropathy who presented 11/1/2019 with hypoglycemia and syncope.  Patient was at the night with her  watching a show when she proceeded to go towards the bathroom after walking several steps felt very lightheaded generally weak and subsequently passed out first responders noted patient hypoglycemic in the 50s and patient was given dextrose upon arrival to the ED she had 2 further episodes that were similar and associated with hypoglycemia.  Her pressures have been labile with a systolic of 70s.  She denies any recent illnesses or other significant subjective symptoms.  Patient is visiting from [place].    Code Status  No Order    Review of Systems  Review of Systems   Constitutional: Positive for chills and malaise/fatigue. Negative for fever.   HENT: Negative for congestion.    Eyes: Negative for blurred vision and double vision.   Respiratory: Negative for sputum production and shortness of breath.    Cardiovascular: Negative for chest pain, palpitations and leg swelling.   Gastrointestinal: Positive for nausea and vomiting.   Genitourinary: Negative for dysuria.   Neurological: Positive for weakness and headaches. Negative for speech change and focal weakness.   Psychiatric/Behavioral: Negative for depression.   All other systems reviewed and are negative.      Past Medical History   has a past medical history of Back pain, Hypertension, and Psychiatric disorder.    Surgical History   has a past surgical history that includes finger or hand incision and drainage (8/3/08).    Family History  family history is not on file.  Reviewed not pertinent to current condition    Social  History   reports that she has never smoked. She does not have any smokeless tobacco history on file. She reports that she drinks alcohol. She reports that she does not use drugs.    Medications  Home Medications     Reviewed by Kary Mortensen (Pharmacy [facility]) on 11/01/19 at 2152  Med List Status: Complete   Medication Last Dose Status   aspirin EC (ECOTRIN) 81 MG Tablet Delayed Response 11/1/2019 Active   CALCIUM PO 11/1/2019 Active   Cholecalciferol (VITAMIN D3 PO) 11/1/2019 Active   DULoxetine (CYMBALTA) 60 MG Cap DR Particles delayed-release capsule 11/1/2019 Active   gabapentin (NEURONTIN) 600 MG tablet 11/1/2019 Active   hydrocortisone (CORTEF) 10 MG Tab 11/1/2019 Active   lisinopril (PRINIVIL, ZESTRIL) 40 MG tablet 11/1/2019 Active   MAGNESIUM PO 11/1/2019 Active   omeprazole (PRILOSEC) 20 MG delayed-release capsule 11/1/2019 Active   POTASSIUM PO 11/1/2019 Active   therapeutic multivitamin-minerals (THERAGRAN-M) Tab 11/1/2019 Active   zolpidem (AMBIEN) 10 MG Tab 10/31/2019 Active              Current Facility-Administered Medications   Medication Dose Route Frequency Provider Last Rate Last Dose   • DEXTROSE 10 % IV SOLN            • PHENYLEPHRINE HCL-NACL 1-0.9 MG/10ML-% IV SOSY            • norepinephrine (LEVOPHED) 8 mg in  mL Infusion  0-30 mcg/min Intravenous Continuous Jimi Saba D.O.         Current Outpatient Medications   Medication Sig Dispense Refill   • hydrocortisone (CORTEF) 10 MG Tab Take 10 mg by mouth 3 times a day.     • gabapentin (NEURONTIN) 600 MG tablet Take 600 mg by mouth 3 times a day.     • zolpidem (AMBIEN) 10 MG Tab Take 10 mg by mouth every bedtime.     • DULoxetine (CYMBALTA) 60 MG Cap DR Particles delayed-release capsule Take 60 mg by mouth every day.     • lisinopril (PRINIVIL, ZESTRIL) 40 MG tablet Take 40 mg by mouth every day.     • aspirin EC (ECOTRIN) 81 MG Tablet Delayed Response Take 81 mg by mouth every day.     • POTASSIUM PO Take 1 Dose by  mouth every day. Unknown OTC Strength.     • omeprazole (PRILOSEC) 20 MG delayed-release capsule Take 20 mg by mouth every day.     • MAGNESIUM PO Take 1 Dose by mouth every day. Unknown OTC Strength.     • Cholecalciferol (VITAMIN D3 PO) Take 1 Dose by mouth every day. Unknown OTC Strength.     • CALCIUM PO Take 1 Dose by mouth every day. Unknown OTC Strength.     • therapeutic multivitamin-minerals (THERAGRAN-M) Tab Take 1 Tab by mouth every day.         Allergies  Allergies   Allergen Reactions   • Heparin        Vital Signs last 24 hours  Temp:  [36.8 °C (98.3 °F)-37.3 °C (99.2 °F)] 36.8 °C (98.3 °F)  Pulse:  [] 94  Resp:  [16-27] 22  BP: ()/(37-68) 102/57  SpO2:  [95 %] 95 %    Physical Exam  Physical Exam   Constitutional: She appears well-developed and well-nourished.   HENT:   Head: Normocephalic and atraumatic.   Eyes: Pupils are equal, round, and reactive to light. Conjunctivae are normal.   Neck: Tracheal deviation present.   Cardiovascular: Normal rate and intact distal pulses.   Pulmonary/Chest: She has no wheezes. She has no rales.   Abdominal: Soft. She exhibits no distension. There is no tenderness.   Musculoskeletal: She exhibits no edema.   Neurological: No cranial nerve deficit.   Skin: Skin is warm and dry.   Psychiatric: She has a normal mood and affect.   Nursing note and vitals reviewed.      Fluids  No intake or output data in the 24 hours ending 19 5819    Laboratory  Recent Results (from the past 48 hour(s))   EKG    Collection Time: 19  8:29 PM   Result Value Ref Range    Report       [facility] Emergency Dept.    Test Date:  2019  Pt Name:    TIMOTHY HANCOCK                 Department: ER  MRN:        [id number]                      Room:       RD 05  Gender:     Female                       Technician: [id number]  :        1950                   Requested By:ER TRIAGE PROTOCOL  Order #:    [id number]                    Reading  MD:    Measurements  Intervals                                Axis  Rate:       99                           P:          35  NY:         160                          QRS:        -3  QRSD:       87                           T:          3  QT:         340  QTc:        437    Interpretive Statements  Sinus rhythm  Inferior infarct, old  Consider anterior infarct  Compared to ECG 08/03/2008 12:04:09  Left ventricular hypertrophy no longer present  Myocardial infarct finding still present     ACCU-CHEK GLUCOSE    Collection Time: 11/01/19  9:42 PM   Result Value Ref Range    Glucose - Accu-Ck 54 (L) 65 - 99 mg/dL   CBC WITH DIFFERENTIAL    Collection Time: 11/01/19 10:06 PM   Result Value Ref Range    WBC 6.4 4.8 - 10.8 K/uL    RBC 4.58 4.20 - 5.40 M/uL    Hemoglobin 14.1 12.0 - 16.0 g/dL    Hematocrit 43.0 37.0 - 47.0 %    MCV 93.9 81.4 - 97.8 fL    MCH 30.8 27.0 - 33.0 pg    MCHC 32.8 (L) 33.6 - 35.0 g/dL    RDW 44.6 35.9 - 50.0 fL    Platelet Count 149 (L) 164 - 446 K/uL    MPV 10.5 9.0 - 12.9 fL    Neutrophils-Polys 79.40 (H) 44.00 - 72.00 %    Lymphocytes 13.70 (L) 22.00 - 41.00 %    Monocytes 5.60 0.00 - 13.40 %    Eosinophils 0.60 0.00 - 6.90 %    Basophils 0.50 0.00 - 1.80 %    Immature Granulocytes 0.20 0.00 - 0.90 %    Nucleated RBC 0.00 /100 WBC    Neutrophils (Absolute) 5.09 2.00 - 7.15 K/uL    Lymphs (Absolute) 0.88 (L) 1.00 - 4.80 K/uL    Monos (Absolute) 0.36 0.00 - 0.85 K/uL    Eos (Absolute) 0.04 0.00 - 0.51 K/uL    Baso (Absolute) 0.03 0.00 - 0.12 K/uL    Immature Granulocytes (abs) 0.01 0.00 - 0.11 K/uL    NRBC (Absolute) 0.00 K/uL   COMP METABOLIC PANEL    Collection Time: 11/01/19 10:06 PM   Result Value Ref Range    Sodium 133 (L) 135 - 145 mmol/L    Potassium 3.9 3.6 - 5.5 mmol/L    Chloride 102 96 - 112 mmol/L    Co2 18 (L) 20 - 33 mmol/L    Anion Gap 13.0 (H) 0.0 - 11.9    Glucose 321 (H) 65 - 99 mg/dL    Bun 25 (H) 8 - 22 mg/dL    Creatinine 1.24 0.50 - 1.40 mg/dL    Calcium 9.2 8.5 - 10.5 mg/dL     AST(SGOT) 55 (H) 12 - 45 U/L    ALT(SGPT) 37 2 - 50 U/L    Alkaline Phosphatase 99 30 - 99 U/L    Total Bilirubin 0.4 0.1 - 1.5 mg/dL    Albumin 4.5 3.2 - 4.9 g/dL    Total Protein 7.7 6.0 - 8.2 g/dL    Globulin 3.2 1.9 - 3.5 g/dL    A-G Ratio 1.4 g/dL   MAGNESIUM    Collection Time: 11/01/19 10:06 PM   Result Value Ref Range    Magnesium 1.6 1.5 - 2.5 mg/dL   PHOSPHORUS    Collection Time: 11/01/19 10:06 PM   Result Value Ref Range    Phosphorus 3.9 2.5 - 4.5 mg/dL   ESTIMATED GFR    Collection Time: 11/01/19 10:06 PM   Result Value Ref Range    GFR If  52 (A) >60 mL/min/1.73 m 2    GFR If Non  43 (A) >60 mL/min/1.73 m 2       Imaging  DX-CHEST-PORTABLE (1 VIEW)    (Results Pending)       Assessment/Plan  Adrenal insufficiency ([place]'s disease) (HCC)  Assessment & Plan  History of pituitary lesion  On maintenance hydrocortisone  Suspect insufficiency  Stress dose steroids  Pressors to maintain map greater than 65  No obvious infectious etiology leading to systemic stress    Syncope  Assessment & Plan  Suspected to be secondary to adrenal insufficiency  Check echocardiogram, continuous cardiac monitoring, troponin,  Check orthostatics  Hourly  FS BS with titration of D10to keep BS greater than 80    Hypoglycemia  Assessment & Plan  Symptomatic with reported syncope  Q. one hour FS BS  Stress dose steroids  D10 infusion  Check cortisol level      Discussed patient condition and risk of morbidity and/or mortality with Hospitalist, RN, RT, Patient and ERP.    The patient remains critically ill.  Critical care time = 32 minutes in directly providing and coordinating critical care and extensive data review.  No time overlap and excludes procedures.

## 2019-11-02 NOTE — CARE PLAN
Problem: Communication  Goal: The ability to communicate needs accurately and effectively will improve  Outcome: PROGRESSING AS EXPECTED     Problem: Safety  Goal: Will remain free from injury  Outcome: PROGRESSING AS EXPECTED     Problem: Pain Management  Goal: Pain level will decrease to patient's comfort goal  Outcome: PROGRESSING AS EXPECTED     Problem: Fluid Volume:  Goal: Will maintain balanced intake and output  Outcome: PROGRESSING AS EXPECTED

## 2019-11-02 NOTE — H&P
Hospital Medicine History & Physical Note    Date of Service  11/2/2019    Primary Care Physician  Pcp Pt States None    Consultants  Critical care    Code Status  Full code    Chief Complaint  Dizziness    History of Presenting Illness  69 y.o. 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  Patient states she was at a concert with her  when she got up to use the bathroom and developed symptoms of lightheadedness and then lost consciousness.  Patient was unconscious for about 10 seconds.  When she regained consciousness she threw up.  EMS was called and her initial blood glucose was noted to be low at 68.  She does not take insulin.  She has been compliant with all her medications including her hydrocortisone.  She denies any fevers, chills, chest pain or shortness of breath.  She denies any new focal weakness or numbness.  She denies any abdominal pain, dysuria or diarrhea.  She denies any previous syncopal episodes.  In the ER the patient was noted to be hypotensive and hypoglycemic at 54.  She was given IV dextrose and IV Solu-Cortef.    Chest x-ray interpreted by me reveals no acute cardia pulmonary process.  Right IJ central line appears in place  EKG interpreted by me reveals sinus rhythm with Q waves in inferior leads.  No ST elevation or ST depression noted    Review of Systems  Review of Systems   Constitutional: Negative for chills, diaphoresis and fever.   HENT: Negative for hearing loss and sore throat.    Eyes: Negative for blurred vision.   Respiratory: Negative for cough, sputum production, shortness of breath and wheezing.    Cardiovascular: Negative for chest pain, palpitations and leg swelling.   Gastrointestinal: Negative for abdominal pain, blood in stool, diarrhea, nausea and vomiting.   Genitourinary: Negative for dysuria, flank pain and urgency.    Musculoskeletal: Negative for back pain, joint pain, myalgias and neck pain.   Skin: Negative for rash.   Neurological: Positive for dizziness, loss of consciousness and weakness. Negative for focal weakness, seizures and headaches.   Endo/Heme/Allergies: Does not bruise/bleed easily.   Psychiatric/Behavioral: Negative for suicidal ideas.   All other systems reviewed and are negative.      Past Medical History   has a past medical history of Back pain, Hypertension, and Psychiatric disorder.    Surgical History   has a past surgical history that includes finger or hand incision and drainage (8/3/08).     Family History  No pertinent family history    Social History   reports that she has never smoked. She does not have any smokeless tobacco history on file. She reports that she drinks alcohol. She reports that she does not use drugs.    Allergies  Allergies   Allergen Reactions   • Heparin        Medications  Prior to Admission Medications   Prescriptions Last Dose Informant Patient Reported? Taking?   CALCIUM PO 11/1/2019 at 0700 Patient Yes Yes   Sig: Take 1 Dose by mouth every day. Unknown OTC Strength.   Cholecalciferol (VITAMIN D3 PO) 11/1/2019 at 0700 Patient Yes Yes   Sig: Take 1 Dose by mouth every day. Unknown OTC Strength.   DULoxetine (CYMBALTA) 60 MG Cap DR Particles delayed-release capsule 11/1/2019 at 0700 Patient Yes Yes   Sig: Take 60 mg by mouth every day.   MAGNESIUM PO 11/1/2019 at 1200 Patient Yes Yes   Sig: Take 1 Dose by mouth every day. Unknown OTC Strength.   POTASSIUM PO 11/1/2019 at 0700 Patient Yes Yes   Sig: Take 1 Dose by mouth every day. Unknown OTC Strength.   aspirin EC (ECOTRIN) 81 MG Tablet Delayed Response 11/1/2019 at 0700 Patient Yes Yes   Sig: Take 81 mg by mouth every day.   gabapentin (NEURONTIN) 600 MG tablet 11/1/2019 at 1200 Patient Yes Yes   Sig: Take 600 mg by mouth 3 times a day.   hydrocortisone (CORTEF) 10 MG Tab 11/1/2019 at 1200 Patient Yes Yes   Sig: Take 10 mg by  mouth 3 times a day.   lisinopril (PRINIVIL, ZESTRIL) 40 MG tablet 11/1/2019 at 0700 Patient Yes Yes   Sig: Take 40 mg by mouth every day.   omeprazole (PRILOSEC) 20 MG delayed-release capsule 11/1/2019 at 0700 Patient Yes Yes   Sig: Take 20 mg by mouth every day.   therapeutic multivitamin-minerals (THERAGRAN-M) Tab 11/1/2019 at 0700 Patient Yes Yes   Sig: Take 1 Tab by mouth every day.   zolpidem (AMBIEN) 10 MG Tab 10/31/2019 at 2200 Patient Yes Yes   Sig: Take 10 mg by mouth every bedtime.      Facility-Administered Medications: None       Physical Exam  Temp:  [36.8 °C (98.3 °F)-37.3 °C (99.2 °F)] 36.8 °C (98.3 °F)  Pulse:  [] 98  Resp:  [16-27] 19  BP: ()/(37-68) 95/55  SpO2:  [94 %-96 %] 94 %    Physical Exam   Constitutional: She is oriented to person, place, and time. She appears well-developed and well-nourished. No distress.   HENT:   Head: Normocephalic and atraumatic.   Mouth/Throat: Oropharynx is clear and moist.   Eyes: Pupils are equal, round, and reactive to light. Conjunctivae are normal. Right eye exhibits no discharge. Left eye exhibits no discharge. No scleral icterus.   Neck: Normal range of motion. Neck supple. No tracheal deviation present.   Cardiovascular: Normal rate, regular rhythm and normal heart sounds.   Pulmonary/Chest: Effort normal and breath sounds normal. No stridor. No respiratory distress. She has no wheezes. She has no rales.   Abdominal: Soft. Bowel sounds are normal. She exhibits no distension. There is no tenderness. There is no rebound and no guarding.   Musculoskeletal: Normal range of motion. She exhibits no edema, tenderness or deformity.   Lymphadenopathy:     She has no cervical adenopathy.   Neurological: She is alert and oriented to person, place, and time. No cranial nerve deficit. Coordination normal.   Skin: Skin is warm and dry. No rash noted. She is not diaphoretic. No erythema.   Psychiatric: She has a normal mood and affect. Her behavior is normal.    Nursing note and vitals reviewed.      Laboratory:  Recent Labs     11/01/19 2206 11/02/19  0113   WBC 6.4 6.9   RBC 4.58 4.37   HEMOGLOBIN 14.1 13.3   HEMATOCRIT 43.0 40.8   MCV 93.9 93.4   MCH 30.8 30.4   MCHC 32.8* 32.6*   RDW 44.6 44.2   PLATELETCT 149* 157*   MPV 10.5 10.4     Recent Labs     11/01/19 2206 11/02/19  0000   SODIUM 133* 133*   POTASSIUM 3.9 3.6   CHLORIDE 102 103   CO2 18* 21   GLUCOSE 321* 195*   BUN 25* 27*   CREATININE 1.24 1.31   CALCIUM 9.2 8.7     Recent Labs     11/01/19 2206 11/02/19  0000   ALTSGPT 37 36   ASTSGOT 55* 46*   ALKPHOSPHAT 99 90   TBILIRUBIN 0.4 0.4   GLUCOSE 321* 195*         No results for input(s): NTPROBNP in the last 72 hours.      Recent Labs     11/01/19 2206   TROPONINT 24*       Urinalysis:    No results found     Imaging:  DX-CHEST-PORTABLE (1 VIEW)   Final Result      1.  Right IJ central venous catheter tip projects over the distal SVC. No pneumothorax.   2.  No acute abnormality.      EC-ECHOCARDIOGRAM COMPLETE W/O CONT    (Results Pending)   DX-CHEST-PORTABLE (1 VIEW)    (Results Pending)         Assessment/Plan:  I anticipate this patient will require at least two midnights for appropriate medical management, necessitating inpatient admission.    Addisonian crisis (HCC)- (present on admission)  Assessment & Plan  Patient has been started on IV hydrocortisone   Aggressive IV fluid hydration with NS  IV Levophed titrate to map greater than 65  Rule out infection.  Follow blood cultures and UA  Rule out cardiogenic causes of hypotension. Continuous cardiac monitoring.  Check 2D echo      GERD (gastroesophageal reflux disease)- (present on admission)  Assessment & Plan  Continue omeprazole    Hyponatremia- (present on admission)  Assessment & Plan  IV fluid hydration with normal saline  Monitor BMP and assess response      Syncope- (present on admission)  Assessment & Plan  Rule out cardiogenic causes  Continuous cardiac monitoring on telemetry  Check 2D  echo  Check orthostatics      Hypoglycemia- (present on admission)  Assessment & Plan  Patient was given IV dextrose with improvement of her blood sugar  Hypoglycemic protocol in place          VTE prophylaxis: Heparin

## 2019-11-02 NOTE — ASSESSMENT & PLAN NOTE
History of pituitary lesion  On maintenance hydrocortisone  Suspect insufficiency  Stress dose steroids  Pressors to maintain map greater than 65  No obvious infectious etiology leading to systemic stress

## 2019-11-02 NOTE — PROGRESS NOTES
Pt transferred from ICU after having syncopal episode with +LOC and vomiting but no fall.  Pt states she and her  were at concert and she got up to go to bathroom and felt so dizzy she sat down before passing out.  Pt is alert and oriented x4.  Denies pain. Pt oriented to room.  Vitals WDP.

## 2019-11-02 NOTE — ED NOTES
Med Rec Updated and Complete per Pt at bedside with List (returned)  Allergies Reviewed  No PO ABX Last 14 days.    Pt taking LD ASA daily.

## 2019-11-03 VITALS
RESPIRATION RATE: 16 BRPM | WEIGHT: 166.89 LBS | HEIGHT: 62 IN | DIASTOLIC BLOOD PRESSURE: 74 MMHG | OXYGEN SATURATION: 93 % | BODY MASS INDEX: 30.71 KG/M2 | TEMPERATURE: 98 F | HEART RATE: 82 BPM | SYSTOLIC BLOOD PRESSURE: 128 MMHG

## 2019-11-03 PROBLEM — E27.2 ADDISONIAN CRISIS (HCC): Status: RESOLVED | Noted: 2019-11-02 | Resolved: 2019-11-03

## 2019-11-03 PROBLEM — E87.1 HYPONATREMIA: Status: RESOLVED | Noted: 2019-11-02 | Resolved: 2019-11-03

## 2019-11-03 PROBLEM — R55 SYNCOPE: Status: RESOLVED | Noted: 2019-11-01 | Resolved: 2019-11-03

## 2019-11-03 PROBLEM — E16.2 HYPOGLYCEMIA: Status: RESOLVED | Noted: 2019-11-01 | Resolved: 2019-11-03

## 2019-11-03 LAB
ALBUMIN SERPL BCP-MCNC: 3.1 G/DL (ref 3.2–4.9)
ALBUMIN/GLOB SERPL: 1 G/DL
ALP SERPL-CCNC: 79 U/L (ref 30–99)
ALT SERPL-CCNC: 34 U/L (ref 2–50)
ANION GAP SERPL CALC-SCNC: 6 MMOL/L (ref 0–11.9)
APPEARANCE UR: CLEAR
AST SERPL-CCNC: 35 U/L (ref 12–45)
BACTERIA #/AREA URNS HPF: NEGATIVE /HPF
BASOPHILS # BLD AUTO: 0.4 % (ref 0–1.8)
BASOPHILS # BLD: 0.02 K/UL (ref 0–0.12)
BILIRUB SERPL-MCNC: 0.3 MG/DL (ref 0.1–1.5)
BILIRUB UR QL STRIP.AUTO: NEGATIVE
BUN SERPL-MCNC: 20 MG/DL (ref 8–22)
CALCIUM SERPL-MCNC: 6.9 MG/DL (ref 8.5–10.5)
CHLORIDE SERPL-SCNC: 113 MMOL/L (ref 96–112)
CO2 SERPL-SCNC: 23 MMOL/L (ref 20–33)
COLOR UR: YELLOW
CREAT SERPL-MCNC: 1.09 MG/DL (ref 0.5–1.4)
EOSINOPHIL # BLD AUTO: 0.04 K/UL (ref 0–0.51)
EOSINOPHIL NFR BLD: 0.8 % (ref 0–6.9)
EPI CELLS #/AREA URNS HPF: ABNORMAL /HPF
ERYTHROCYTE [DISTWIDTH] IN BLOOD BY AUTOMATED COUNT: 43.9 FL (ref 35.9–50)
GLOBULIN SER CALC-MCNC: 3 G/DL (ref 1.9–3.5)
GLUCOSE BLD-MCNC: 103 MG/DL (ref 65–99)
GLUCOSE SERPL-MCNC: 96 MG/DL (ref 65–99)
GLUCOSE UR STRIP.AUTO-MCNC: NEGATIVE MG/DL
HCT VFR BLD AUTO: 34.4 % (ref 37–47)
HGB BLD-MCNC: 11.4 G/DL (ref 12–16)
HYALINE CASTS #/AREA URNS LPF: ABNORMAL /LPF
IMM GRANULOCYTES # BLD AUTO: 0.02 K/UL (ref 0–0.11)
IMM GRANULOCYTES NFR BLD AUTO: 0.4 % (ref 0–0.9)
KETONES UR STRIP.AUTO-MCNC: NEGATIVE MG/DL
LEUKOCYTE ESTERASE UR QL STRIP.AUTO: ABNORMAL
LYMPHOCYTES # BLD AUTO: 1.53 K/UL (ref 1–4.8)
LYMPHOCYTES NFR BLD: 30.7 % (ref 22–41)
MAGNESIUM SERPL-MCNC: 2.2 MG/DL (ref 1.5–2.5)
MCH RBC QN AUTO: 30.7 PG (ref 27–33)
MCHC RBC AUTO-ENTMCNC: 33.1 G/DL (ref 33.6–35)
MCV RBC AUTO: 92.7 FL (ref 81.4–97.8)
MICRO URNS: ABNORMAL
MONOCYTES # BLD AUTO: 0.48 K/UL (ref 0–0.85)
MONOCYTES NFR BLD AUTO: 9.6 % (ref 0–13.4)
NEUTROPHILS # BLD AUTO: 2.89 K/UL (ref 2–7.15)
NEUTROPHILS NFR BLD: 58.1 % (ref 44–72)
NITRITE UR QL STRIP.AUTO: NEGATIVE
NRBC # BLD AUTO: 0 K/UL
NRBC BLD-RTO: 0 /100 WBC
PH UR STRIP.AUTO: 6 [PH] (ref 5–8)
PLATELET # BLD AUTO: 140 K/UL (ref 164–446)
PMV BLD AUTO: 10.3 FL (ref 9–12.9)
POTASSIUM SERPL-SCNC: 3.5 MMOL/L (ref 3.6–5.5)
PROT SERPL-MCNC: 6.1 G/DL (ref 6–8.2)
PROT UR QL STRIP: NEGATIVE MG/DL
RBC # BLD AUTO: 3.71 M/UL (ref 4.2–5.4)
RBC # URNS HPF: ABNORMAL /HPF
RBC UR QL AUTO: ABNORMAL
SODIUM SERPL-SCNC: 142 MMOL/L (ref 135–145)
SP GR UR STRIP.AUTO: 1.02
UROBILINOGEN UR STRIP.AUTO-MCNC: 0.2 MG/DL
WBC # BLD AUTO: 5 K/UL (ref 4.8–10.8)
WBC #/AREA URNS HPF: ABNORMAL /HPF

## 2019-11-03 PROCEDURE — 83735 ASSAY OF MAGNESIUM: CPT

## 2019-11-03 PROCEDURE — 700105 HCHG RX REV CODE 258: Performed by: HOSPITALIST

## 2019-11-03 PROCEDURE — 700102 HCHG RX REV CODE 250 W/ 637 OVERRIDE(OP): Performed by: INTERNAL MEDICINE

## 2019-11-03 PROCEDURE — 82962 GLUCOSE BLOOD TEST: CPT

## 2019-11-03 PROCEDURE — 700111 HCHG RX REV CODE 636 W/ 250 OVERRIDE (IP): Performed by: HOSPITALIST

## 2019-11-03 PROCEDURE — A9270 NON-COVERED ITEM OR SERVICE: HCPCS | Performed by: HOSPITALIST

## 2019-11-03 PROCEDURE — 700102 HCHG RX REV CODE 250 W/ 637 OVERRIDE(OP): Performed by: HOSPITALIST

## 2019-11-03 PROCEDURE — 85025 COMPLETE CBC W/AUTO DIFF WBC: CPT

## 2019-11-03 PROCEDURE — A9270 NON-COVERED ITEM OR SERVICE: HCPCS | Performed by: INTERNAL MEDICINE

## 2019-11-03 PROCEDURE — 87086 URINE CULTURE/COLONY COUNT: CPT

## 2019-11-03 PROCEDURE — 99239 HOSP IP/OBS DSCHRG MGMT >30: CPT | Performed by: HOSPITALIST

## 2019-11-03 PROCEDURE — 80053 COMPREHEN METABOLIC PANEL: CPT

## 2019-11-03 PROCEDURE — 81001 URINALYSIS AUTO W/SCOPE: CPT

## 2019-11-03 RX ORDER — IBUPROFEN 200 MG
1900 CAPSULE ORAL DAILY
Status: DISCONTINUED | OUTPATIENT
Start: 2019-11-03 | End: 2019-11-03 | Stop reason: HOSPADM

## 2019-11-03 RX ORDER — POTASSIUM CHLORIDE 20 MEQ/1
40 TABLET, EXTENDED RELEASE ORAL ONCE
Status: COMPLETED | OUTPATIENT
Start: 2019-11-03 | End: 2019-11-03

## 2019-11-03 RX ORDER — IBUPROFEN 200 MG
950 CAPSULE ORAL DAILY
Qty: 30 TAB | COMMUNITY
Start: 2019-11-04

## 2019-11-03 RX ORDER — HYDROCORTISONE 10 MG/1
TABLET ORAL
Start: 2019-11-03

## 2019-11-03 RX ORDER — CEFDINIR 300 MG/1
300 CAPSULE ORAL 2 TIMES DAILY
Qty: 4 CAP | Refills: 0 | Status: SHIPPED | OUTPATIENT
Start: 2019-11-04 | End: 2019-11-06

## 2019-11-03 RX ADMIN — CALCIUM CITRATE 200 MG (950 MG) TABLET 1900 MG: at 09:24

## 2019-11-03 RX ADMIN — DULOXETINE HYDROCHLORIDE 60 MG: 60 CAPSULE, DELAYED RELEASE ORAL at 04:51

## 2019-11-03 RX ADMIN — HYDROCORTISONE SODIUM SUCCINATE 25 MG: 100 INJECTION, POWDER, FOR SOLUTION INTRAMUSCULAR; INTRAVENOUS at 04:50

## 2019-11-03 RX ADMIN — GABAPENTIN 600 MG: 300 CAPSULE ORAL at 04:51

## 2019-11-03 RX ADMIN — CEFTRIAXONE SODIUM 2 G: 2 INJECTION, POWDER, FOR SOLUTION INTRAMUSCULAR; INTRAVENOUS at 08:04

## 2019-11-03 RX ADMIN — POTASSIUM CHLORIDE 40 MEQ: 1500 TABLET, EXTENDED RELEASE ORAL at 08:05

## 2019-11-03 RX ADMIN — OMEPRAZOLE 20 MG: 20 CAPSULE, DELAYED RELEASE ORAL at 04:51

## 2019-11-03 ASSESSMENT — PATIENT HEALTH QUESTIONNAIRE - PHQ9
1. LITTLE INTEREST OR PLEASURE IN DOING THINGS: NOT AT ALL
SUM OF ALL RESPONSES TO PHQ9 QUESTIONS 1 AND 2: 0
2. FEELING DOWN, DEPRESSED, IRRITABLE, OR HOPELESS: NOT AT ALL

## 2019-11-03 NOTE — PROGRESS NOTES
RIJ removed for discharge.   3 sutures removed.  Tip intact.  Pressure dressing applied after pressure was held.  Pt lying supine for 30 min.

## 2019-11-03 NOTE — PROGRESS NOTES
Pt ready for discharge.  DC orders reviewed with pt and  and both state understanding.  Belongings with .  No c/o at departure

## 2019-11-03 NOTE — DISCHARGE INSTRUCTIONS
Discharge Instructions    Discharged to home by car with relative. Discharged via wheelchair, hospital escort: Yes.  Special equipment needed: Not Applicable    Be sure to schedule a follow-up appointment with your primary care doctor or any specialists as instructed.     Discharge Plan:   Influenza Vaccine Indication: Not indicated: Previously immunized this influenza season and > 8 years of age    I understand that a diet low in cholesterol, fat, and sodium is recommended for good health. Unless I have been given specific instructions below for another diet, I accept this instruction as my diet prescription.   Other diet: Gluten Free    Special Instructions: None    · Is patient discharged on Warfarin / Coumadin?   No     Depression / Suicide Risk    As you are discharged from this [facility] facility, it is important to learn how to keep safe from harming yourself.    Recognize the warning signs:  · Abrupt changes in personality, positive or negative- including increase in energy   · Giving away possessions  · Change in eating patterns- significant weight changes-  positive or negative  · Change in sleeping patterns- unable to sleep or sleeping all the time   · Unwillingness or inability to communicate  · Depression  · Unusual sadness, discouragement and loneliness  · Talk of wanting to die  · Neglect of personal appearance   · Rebelliousness- reckless behavior  · Withdrawal from people/activities they love  · Confusion- inability to concentrate     If you or a loved one observes any of these behaviors or has concerns about self-harm, here's what you can do:  · Talk about it- your feelings and reasons for harming yourself  · Remove any means that you might use to hurt yourself (examples: pills, rope, extension cords, firearm)  · Get professional help from the community (Mental Health, Substance Abuse, psychological counseling)  · Do not be alone:Call your Safe Contact- someone whom you trust who will be there  for you.  · Call your local CRISIS HOTLINE 536-0638 or 140-694-3324  · Call your local Children's Mobile Crisis Response Team [place] (280) 647-4922 or www.[employer]  · Call the toll free National Suicide Prevention Hotlines   · National Suicide Prevention Lifeline 256-251-MYRY (6830)  · [facility] Line Network 800-SUICIDE (698-6513)      Discharge Instructions per Minh Kwong M.D.    Double your hydrocortiosne dose this evening and tomorrow as discussed  Hold lisinopril until you see your PCP     DIET: cardiac    ACTIVITY: as tolerated    DIAGNOSIS: UTI adrenal crisis    Return to ER if lightheadedness N/V

## 2019-11-03 NOTE — DISCHARGE SUMMARY
Discharge Summary    CHIEF COMPLAINT ON ADMISSION  Chief Complaint   Patient presents with   • Syncope     Witnessed syncope by  while sitting. Denies fall, no head trauma. Takes daily aspirin. BG = 68 PTA by EMS       Reason for Admission  EMS     Admission Date  11/1/2019    CODE STATUS  Full Code    HPI & HOSPITAL COURSE  This is a 69 y.o. female here with history of adrenal insufficiency secondary to pituitary adenoma admitted with hypotension hypoglycemia and UTI.  She was initially admitted to the intensive care unit she responded to IV fluid resuscitation and stress dose hydrocortisone.  Urinalysis was concerning for UTI and she was started on IV ceftriaxone.  Patient clinically improved and was transferred to telemetry floor.  On exam this morning she is alert and oriented she is tolerating her diet and feels back to her baseline.  Her blood pressure is stable.  Her stress dose hydrocortisone had been weaned down to 25 mg twice daily IV.  She is anxious to be discharged home and is clinically stable for discharge.  She will complete 3-day course of antibiotics and will double her maintenance dose of hydrocortisone today and tomorrow and then resume her maintenance dose.  I instructed her to follow-up with her PCP for recheck next week.  Recommended holding her lisinopril until she is reevaluated by her PCP given her hypotension on presentation.       Therefore, she is discharged in good and stable condition to home with close outpatient follow-up.    The patient recovered much more quickly than anticipated on admission.    Discharge Date  11/3/2019    FOLLOW UP ITEMS POST DISCHARGE  Follow-up with PCP for blood pressure recheck    DISCHARGE DIAGNOSES  Active Problems:    GERD (gastroesophageal reflux disease) POA: Yes  Resolved Problems:    Addisonian crisis (HCC) POA: Yes    Hypoglycemia POA: Yes    Syncope POA: Yes    Hyponatremia POA: Yes  acute cystitis without hematuria    FOLLOW UP  No future  appointments.  Primary Care Physician    Schedule an appointment as soon as possible for a visit in 1 week  follow up      MEDICATIONS ON DISCHARGE     Medication List      START taking these medications      Instructions   calcium citrate 950 MG Tabs  Start taking on:  November 4, 2019  Commonly known as:  CALCITRATE   Take 1 Tab by mouth every day.  Dose:  950 mg     cefdinir 300 MG Caps  Start taking on:  November 4, 2019  Commonly known as:  OMNICEF   Take 1 Cap by mouth 2 times a day for 2 days.  Dose:  300 mg        CHANGE how you take these medications      Instructions   hydrocortisone 10 MG Tabs  What changed:    · how much to take  · how to take this  · when to take this  · additional instructions  Commonly known as:  CORTEF   2 po tid then resume usual dose of one po tid on 11/5/2019        CONTINUE taking these medications      Instructions   AMBIEN 10 MG Tabs  Generic drug:  zolpidem   Take 10 mg by mouth every bedtime.  Dose:  10 mg     aspirin EC 81 MG Tbec  Commonly known as:  ECOTRIN   Take 81 mg by mouth every day.  Dose:  81 mg     CALCIUM PO   Take 1 Dose by mouth every day. Unknown OTC Strength.  Dose:  1 Dose     DULoxetine 60 MG Cpep delayed-release capsule  Commonly known as:  CYMBALTA   Take 60 mg by mouth every day.  Dose:  60 mg     gabapentin 600 MG tablet  Commonly known as:  NEURONTIN   Take 600 mg by mouth 3 times a day.  Dose:  600 mg     MAGNESIUM PO   Take 1 Dose by mouth every day. Unknown OTC Strength.  Dose:  1 Dose     omeprazole 20 MG delayed-release capsule  Commonly known as:  PRILOSEC   Take 20 mg by mouth every day.  Dose:  20 mg     POTASSIUM PO   Take 1 Dose by mouth every day. Unknown OTC Strength.  Dose:  1 Dose     therapeutic multivitamin-minerals Tabs   Take 1 Tab by mouth every day.  Dose:  1 Tab     VITAMIN D3 PO   Take 1 Dose by mouth every day. Unknown OTC Strength.  Dose:  1 Dose        STOP taking these medications    lisinopril 40 MG tablet  Commonly known as:   PRINIVIL            Allergies  Allergies   Allergen Reactions   • Heparin        DIET  Orders Placed This Encounter   Procedures   • Diet Order Regular     Standing Status:   Standing     Number of Occurrences:   1     Order Specific Question:   Diet:     Answer:   Regular [1]     Order Specific Question:   Miscellaneous modifications:     Answer:   Gluten Free per PT [10]       ACTIVITY  As tolerated.  Weight bearing as tolerated    CONSULTATIONS  Critical care    PROCEDURES  Central line placement    LABORATORY  Lab Results   Component Value Date    SODIUM 142 11/03/2019    POTASSIUM 3.5 (L) 11/03/2019    CHLORIDE 113 (H) 11/03/2019    CO2 23 11/03/2019    GLUCOSE 96 11/03/2019    BUN 20 11/03/2019    CREATININE 1.09 11/03/2019    CREATININE 1.3 08/06/2008        Lab Results   Component Value Date    WBC 5.0 11/03/2019    HEMOGLOBIN 11.4 (L) 11/03/2019    HEMATOCRIT 34.4 (L) 11/03/2019    PLATELETCT 140 (L) 11/03/2019        Total time of the discharge process exceeds 35 minutes.

## 2019-11-03 NOTE — CARE PLAN
Problem: Pain Management  Goal: Pain level will decrease to patient's comfort goal  Outcome: PROGRESSING AS EXPECTED     Problem: Fluid Volume:  Goal: Will maintain balanced intake and output  Outcome: PROGRESSING AS EXPECTED     Problem: Skin Integrity  Goal: Risk for impaired skin integrity will decrease  Outcome: PROGRESSING AS EXPECTED

## 2019-11-03 NOTE — PROGRESS NOTES
Lab called with critical result of calcium at 6.9. Dr. Tijerina paged at [phone]. Dr. Tijerina paged again at 1950.   Dr. Tijerina notified of critical lab result at 9760.  Critical lab result read back by Dr. Tijerina. No new orders at this time.

## 2019-11-03 NOTE — CARE PLAN
Problem: Communication  Goal: The ability to communicate needs accurately and effectively will improve  Outcome: PROGRESSING AS EXPECTED     Problem: Safety  Goal: Will remain free from injury  Outcome: PROGRESSING AS EXPECTED  Goal: Will remain free from falls  Outcome: PROGRESSING AS EXPECTED     Problem: Infection  Goal: Will remain free from infection  Outcome: PROGRESSING AS EXPECTED     Problem: Venous Thromboembolism (VTW)/Deep Vein Thrombosis (DVT) Prevention:  Goal: Patient will participate in Venous Thrombosis (VTE)/Deep Vein Thrombosis (DVT)Prevention Measures  Outcome: PROGRESSING AS EXPECTED     Problem: Bowel/Gastric:  Goal: Normal bowel function is maintained or improved  Outcome: PROGRESSING AS EXPECTED  Goal: Will not experience complications related to bowel motility  Outcome: PROGRESSING AS EXPECTED     Problem: Knowledge Deficit  Goal: Knowledge of disease process/condition, treatment plan, diagnostic tests, and medications will improve  Outcome: PROGRESSING AS EXPECTED  Goal: Knowledge of the prescribed therapeutic regimen will improve  Outcome: PROGRESSING AS EXPECTED     Problem: Discharge Barriers/Planning  Goal: Patient's continuum of care needs will be met  Outcome: PROGRESSING AS EXPECTED     Problem: Pain Management  Goal: Pain level will decrease to patient's comfort goal  Outcome: PROGRESSING AS EXPECTED     Problem: Fluid Volume:  Goal: Will maintain balanced intake and output  Outcome: PROGRESSING AS EXPECTED     Problem: Skin Integrity  Goal: Risk for impaired skin integrity will decrease  Outcome: PROGRESSING AS EXPECTED

## 2019-11-05 LAB
BACTERIA UR CULT: NORMAL
SIGNIFICANT IND 70042: NORMAL
SITE SITE: NORMAL
SOURCE SOURCE: NORMAL

## 2019-11-07 LAB
BACTERIA BLD CULT: NORMAL
SIGNIFICANT IND 70042: NORMAL
SITE SITE: NORMAL
SOURCE SOURCE: NORMAL

## 2023-04-19 NOTE — ED NOTES
Pt vomitted x 1 after attempting to drink juice. 2 RN's at bedside attempting IV access.  Repeat BG  = 54. ERP notified. Will give D50 Amp when PIV established.    Doxycycline Counseling:  Patient counseled regarding possible photosensitivity and increased risk for sunburn.  Patient instructed to avoid sunlight, if possible.  When exposed to sunlight, patients should wear protective clothing, sunglasses, and sunscreen.  The patient was instructed to call the office immediately if the following severe adverse effects occur:  hearing changes, easy bruising/bleeding, severe headache, or vision changes.  The patient verbalized understanding of the proper use and possible adverse effects of doxycycline.  All of the patient's questions and concerns were addressed.